# Patient Record
Sex: MALE | Race: BLACK OR AFRICAN AMERICAN | NOT HISPANIC OR LATINO | Employment: UNEMPLOYED | URBAN - METROPOLITAN AREA
[De-identification: names, ages, dates, MRNs, and addresses within clinical notes are randomized per-mention and may not be internally consistent; named-entity substitution may affect disease eponyms.]

---

## 2022-02-14 PROBLEM — U07.1 COVID-19: Status: ACTIVE | Noted: 2022-02-14

## 2022-02-14 PROBLEM — R00.1 SYMPTOMATIC BRADYCARDIA: Status: ACTIVE | Noted: 2022-02-14

## 2022-02-16 PROBLEM — F12.188 CANNABIS HYPEREMESIS SYNDROME CONCURRENT WITH AND DUE TO CANNABIS ABUSE: Status: RESOLVED | Noted: 2022-02-16 | Resolved: 2022-02-16

## 2022-02-16 PROBLEM — R11.0 NAUSEA: Status: ACTIVE | Noted: 2022-02-16

## 2022-02-16 PROBLEM — F12.188 CANNABIS HYPEREMESIS SYNDROME CONCURRENT WITH AND DUE TO CANNABIS ABUSE: Status: ACTIVE | Noted: 2022-02-16

## 2022-02-16 PROBLEM — R11.0 NAUSEA: Status: RESOLVED | Noted: 2022-02-16 | Resolved: 2022-02-16

## 2022-02-16 PROBLEM — R00.1 SYMPTOMATIC BRADYCARDIA: Status: RESOLVED | Noted: 2022-02-14 | Resolved: 2022-02-16

## 2024-03-22 ENCOUNTER — HOSPITAL ENCOUNTER (INPATIENT)
Facility: HOSPITAL | Age: 54
LOS: 1 days | Discharge: LEFT BEFORE DISCHARGE | DRG: 065 | End: 2024-03-22
Attending: STUDENT IN AN ORGANIZED HEALTH CARE EDUCATION/TRAINING PROGRAM | Admitting: INTERNAL MEDICINE

## 2024-03-22 ENCOUNTER — HOSPITAL ENCOUNTER (OUTPATIENT)
Dept: CARDIOLOGY | Facility: HOSPITAL | Age: 54
Discharge: HOME | DRG: 065 | End: 2024-03-22
Attending: STUDENT IN AN ORGANIZED HEALTH CARE EDUCATION/TRAINING PROGRAM

## 2024-03-22 VITALS
BODY MASS INDEX: 27.5 KG/M2 | DIASTOLIC BLOOD PRESSURE: 91 MMHG | SYSTOLIC BLOOD PRESSURE: 175 MMHG | HEART RATE: 65 BPM | OXYGEN SATURATION: 100 % | RESPIRATION RATE: 16 BRPM | WEIGHT: 203.06 LBS | TEMPERATURE: 99 F | HEIGHT: 72 IN

## 2024-03-22 DIAGNOSIS — I63.9 STROKE: ICD-10-CM

## 2024-03-22 DIAGNOSIS — I63.311 THROMBOTIC STROKE INVOLVING RIGHT MIDDLE CEREBRAL ARTERY: ICD-10-CM

## 2024-03-22 PROBLEM — Z72.0 TOBACCO USE: Status: ACTIVE | Noted: 2024-03-22

## 2024-03-22 PROBLEM — F19.90 DRUG USE: Status: ACTIVE | Noted: 2024-03-22

## 2024-03-22 LAB
ABO + RH BLD: NORMAL
ALBUMIN SERPL BCP-MCNC: 3.4 G/DL (ref 3.5–5.2)
ALP SERPL-CCNC: 50 U/L (ref 55–135)
ALT SERPL W/O P-5'-P-CCNC: 14 U/L (ref 10–44)
AMPHET+METHAMPHET UR QL: NEGATIVE
ANION GAP SERPL CALC-SCNC: 5 MMOL/L (ref 8–16)
ASCENDING AORTA: 3.49 CM
AST SERPL-CCNC: 23 U/L (ref 10–40)
AV INDEX (PROSTH): 0.98
AV MEAN GRADIENT: 2 MMHG
AV PEAK GRADIENT: 4 MMHG
AV VALVE AREA BY VELOCITY RATIO: 2.81 CM²
AV VALVE AREA: 3.35 CM²
AV VELOCITY RATIO: 0.82
BARBITURATES UR QL SCN>200 NG/ML: NEGATIVE
BASOPHILS # BLD AUTO: 0.05 K/UL (ref 0–0.2)
BASOPHILS NFR BLD: 0.9 % (ref 0–1.9)
BENZODIAZ UR QL SCN>200 NG/ML: NEGATIVE
BILIRUB SERPL-MCNC: 0.5 MG/DL (ref 0.1–1)
BILIRUB UR QL STRIP: NEGATIVE
BLD GP AB SCN CELLS X3 SERPL QL: NORMAL
BSA FOR ECHO PROCEDURE: 2.16 M2
BUN SERPL-MCNC: 10 MG/DL (ref 6–20)
BZE UR QL SCN: NEGATIVE
CALCIUM SERPL-MCNC: 8.7 MG/DL (ref 8.7–10.5)
CANNABINOIDS UR QL SCN: ABNORMAL
CHLORIDE SERPL-SCNC: 107 MMOL/L (ref 95–110)
CHOLEST SERPL-MCNC: 130 MG/DL (ref 120–199)
CHOLEST/HDLC SERPL: 3.6 {RATIO} (ref 2–5)
CLARITY UR REFRACT.AUTO: CLEAR
CO2 SERPL-SCNC: 24 MMOL/L (ref 23–29)
COLOR UR AUTO: YELLOW
CREAT SERPL-MCNC: 0.8 MG/DL (ref 0.5–1.4)
CREAT UR-MCNC: 86 MG/DL (ref 23–375)
CREAT UR-MCNC: 86 MG/DL (ref 23–375)
CV ECHO LV RWT: 0.66 CM
DIFFERENTIAL METHOD BLD: ABNORMAL
DOP CALC AO PEAK VEL: 1.05 M/S
DOP CALC AO VTI: 22.12 CM
DOP CALC LVOT AREA: 3.4 CM2
DOP CALC LVOT DIAMETER: 2.09 CM
DOP CALC LVOT PEAK VEL: 0.86 M/S
DOP CALC LVOT STROKE VOLUME: 74.17 CM3
DOP CALCLVOT PEAK VEL VTI: 21.63 CM
E WAVE DECELERATION TIME: 330.23 MSEC
E/A RATIO: 1.04
E/E' RATIO: 7.5 M/S
ECHO LV POSTERIOR WALL: 1.38 CM (ref 0.6–1.1)
EOSINOPHIL # BLD AUTO: 0.1 K/UL (ref 0–0.5)
EOSINOPHIL NFR BLD: 1.9 % (ref 0–8)
ERYTHROCYTE [DISTWIDTH] IN BLOOD BY AUTOMATED COUNT: 14.6 % (ref 11.5–14.5)
EST. GFR  (NO RACE VARIABLE): >60 ML/MIN/1.73 M^2
FENTANYL UR QL SCN: ABNORMAL
FRACTIONAL SHORTENING: 40 % (ref 28–44)
GLUCOSE SERPL-MCNC: 93 MG/DL (ref 70–110)
GLUCOSE UR QL STRIP: NEGATIVE
HCT VFR BLD AUTO: 37.7 % (ref 40–54)
HDLC SERPL-MCNC: 36 MG/DL (ref 40–75)
HDLC SERPL: 27.7 % (ref 20–50)
HGB BLD-MCNC: 12.6 G/DL (ref 14–18)
HGB UR QL STRIP: NEGATIVE
IMM GRANULOCYTES # BLD AUTO: 0.01 K/UL (ref 0–0.04)
IMM GRANULOCYTES NFR BLD AUTO: 0.2 % (ref 0–0.5)
INTERVENTRICULAR SEPTUM: 1.2 CM (ref 0.6–1.1)
KETONES UR QL STRIP: ABNORMAL
LA MAJOR: 4.19 CM
LA MINOR: 4.37 CM
LA WIDTH: 3.14 CM
LDLC SERPL CALC-MCNC: 83.8 MG/DL (ref 63–159)
LEFT ATRIUM SIZE: 3.42 CM
LEFT ATRIUM VOLUME INDEX: 18.2 ML/M2
LEFT ATRIUM VOLUME: 39.05 CM3
LEFT INTERNAL DIMENSION IN SYSTOLE: 2.48 CM (ref 2.1–4)
LEFT VENTRICLE DIASTOLIC VOLUME INDEX: 35.86 ML/M2
LEFT VENTRICLE DIASTOLIC VOLUME: 76.73 ML
LEFT VENTRICLE MASS INDEX: 91 G/M2
LEFT VENTRICLE SYSTOLIC VOLUME INDEX: 10.2 ML/M2
LEFT VENTRICLE SYSTOLIC VOLUME: 21.93 ML
LEFT VENTRICULAR INTERNAL DIMENSION IN DIASTOLE: 4.16 CM (ref 3.5–6)
LEFT VENTRICULAR MASS: 195.45 G
LEUKOCYTE ESTERASE UR QL STRIP: NEGATIVE
LV LATERAL E/E' RATIO: 6.82 M/S
LV SEPTAL E/E' RATIO: 8.33 M/S
LYMPHOCYTES # BLD AUTO: 1.7 K/UL (ref 1–4.8)
LYMPHOCYTES NFR BLD: 31.5 % (ref 18–48)
MAGNESIUM SERPL-MCNC: 1.7 MG/DL (ref 1.6–2.6)
MCH RBC QN AUTO: 30.1 PG (ref 27–31)
MCHC RBC AUTO-ENTMCNC: 33.4 G/DL (ref 32–36)
MCV RBC AUTO: 90 FL (ref 82–98)
METHADONE UR QL SCN>300 NG/ML: NEGATIVE
MONOCYTES # BLD AUTO: 0.4 K/UL (ref 0.3–1)
MONOCYTES NFR BLD: 7.5 % (ref 4–15)
MV PEAK A VEL: 0.72 M/S
MV PEAK E VEL: 0.75 M/S
MV STENOSIS PRESSURE HALF TIME: 95.77 MS
MV VALVE AREA P 1/2 METHOD: 2.3 CM2
NEUTROPHILS # BLD AUTO: 3.1 K/UL (ref 1.8–7.7)
NEUTROPHILS NFR BLD: 58 % (ref 38–73)
NITRITE UR QL STRIP: NEGATIVE
NONHDLC SERPL-MCNC: 94 MG/DL
NRBC BLD-RTO: 0 /100 WBC
OPIATES UR QL SCN: ABNORMAL
PCP UR QL SCN>25 NG/ML: NEGATIVE
PH UR STRIP: 7 [PH] (ref 5–8)
PHOSPHATE SERPL-MCNC: 2.6 MG/DL (ref 2.7–4.5)
PLATELET # BLD AUTO: 180 K/UL (ref 150–450)
PMV BLD AUTO: 9.6 FL (ref 9.2–12.9)
POCT GLUCOSE: 98 MG/DL (ref 70–110)
POCT GLUCOSE: 98 MG/DL (ref 70–110)
POTASSIUM SERPL-SCNC: 3.9 MMOL/L (ref 3.5–5.1)
PROT SERPL-MCNC: 6.5 G/DL (ref 6–8.4)
PROT UR QL STRIP: NEGATIVE
RA MAJOR: 5.11 CM
RA PRESSURE ESTIMATED: 3 MMHG
RA WIDTH: 3.14 CM
RBC # BLD AUTO: 4.18 M/UL (ref 4.6–6.2)
RIGHT VENTRICULAR END-DIASTOLIC DIMENSION: 2.66 CM
SINUS: 3 CM
SODIUM SERPL-SCNC: 136 MMOL/L (ref 136–145)
SP GR UR STRIP: >1.03 (ref 1–1.03)
SPECIMEN OUTDATE: NORMAL
STJ: 2.93 CM
TDI LATERAL: 0.11 M/S
TDI SEPTAL: 0.09 M/S
TDI: 0.1 M/S
TOXICOLOGY INFORMATION: ABNORMAL
TRICUSPID ANNULAR PLANE SYSTOLIC EXCURSION: 2.19 CM
TRIGL SERPL-MCNC: 51 MG/DL (ref 30–150)
TSH SERPL DL<=0.005 MIU/L-ACNC: 0.48 UIU/ML (ref 0.4–4)
URN SPEC COLLECT METH UR: ABNORMAL
WBC # BLD AUTO: 5.3 K/UL (ref 3.9–12.7)
Z-SCORE OF LEFT VENTRICULAR DIMENSION IN END DIASTOLE: -5.03
Z-SCORE OF LEFT VENTRICULAR DIMENSION IN END SYSTOLE: -4.16

## 2024-03-22 PROCEDURE — 84443 ASSAY THYROID STIM HORMONE: CPT | Mod: 91 | Performed by: STUDENT IN AN ORGANIZED HEALTH CARE EDUCATION/TRAINING PROGRAM

## 2024-03-22 PROCEDURE — 81003 URINALYSIS AUTO W/O SCOPE: CPT | Mod: 91 | Performed by: STUDENT IN AN ORGANIZED HEALTH CARE EDUCATION/TRAINING PROGRAM

## 2024-03-22 PROCEDURE — 99223 1ST HOSP IP/OBS HIGH 75: CPT | Mod: FS,,, | Performed by: PSYCHIATRY & NEUROLOGY

## 2024-03-22 PROCEDURE — 82962 GLUCOSE BLOOD TEST: CPT

## 2024-03-22 PROCEDURE — 99284 EMERGENCY DEPT VISIT MOD MDM: CPT | Mod: 25

## 2024-03-22 PROCEDURE — 25000003 PHARM REV CODE 250: Performed by: STUDENT IN AN ORGANIZED HEALTH CARE EDUCATION/TRAINING PROGRAM

## 2024-03-22 PROCEDURE — 80053 COMPREHEN METABOLIC PANEL: CPT | Mod: 91 | Performed by: STUDENT IN AN ORGANIZED HEALTH CARE EDUCATION/TRAINING PROGRAM

## 2024-03-22 PROCEDURE — 83735 ASSAY OF MAGNESIUM: CPT | Mod: 91 | Performed by: STUDENT IN AN ORGANIZED HEALTH CARE EDUCATION/TRAINING PROGRAM

## 2024-03-22 PROCEDURE — 80354 DRUG SCREENING FENTANYL: CPT | Performed by: STUDENT IN AN ORGANIZED HEALTH CARE EDUCATION/TRAINING PROGRAM

## 2024-03-22 PROCEDURE — 12000002 HC ACUTE/MED SURGE SEMI-PRIVATE ROOM

## 2024-03-22 PROCEDURE — 84100 ASSAY OF PHOSPHORUS: CPT | Performed by: STUDENT IN AN ORGANIZED HEALTH CARE EDUCATION/TRAINING PROGRAM

## 2024-03-22 PROCEDURE — 97162 PT EVAL MOD COMPLEX 30 MIN: CPT

## 2024-03-22 PROCEDURE — 85025 COMPLETE CBC W/AUTO DIFF WBC: CPT | Mod: 91 | Performed by: STUDENT IN AN ORGANIZED HEALTH CARE EDUCATION/TRAINING PROGRAM

## 2024-03-22 PROCEDURE — 80061 LIPID PANEL: CPT | Mod: 91 | Performed by: STUDENT IN AN ORGANIZED HEALTH CARE EDUCATION/TRAINING PROGRAM

## 2024-03-22 PROCEDURE — 97116 GAIT TRAINING THERAPY: CPT

## 2024-03-22 PROCEDURE — 93306 TTE W/DOPPLER COMPLETE: CPT

## 2024-03-22 PROCEDURE — 80307 DRUG TEST PRSMV CHEM ANLYZR: CPT | Performed by: STUDENT IN AN ORGANIZED HEALTH CARE EDUCATION/TRAINING PROGRAM

## 2024-03-22 PROCEDURE — 93306 TTE W/DOPPLER COMPLETE: CPT | Mod: 26,,, | Performed by: INTERNAL MEDICINE

## 2024-03-22 PROCEDURE — 86901 BLOOD TYPING SEROLOGIC RH(D): CPT | Performed by: STUDENT IN AN ORGANIZED HEALTH CARE EDUCATION/TRAINING PROGRAM

## 2024-03-22 RX ORDER — ATORVASTATIN CALCIUM 40 MG/1
40 TABLET, FILM COATED ORAL DAILY
Status: DISCONTINUED | OUTPATIENT
Start: 2024-03-22 | End: 2024-03-22 | Stop reason: HOSPADM

## 2024-03-22 RX ORDER — SODIUM,POTASSIUM PHOSPHATES 280-250MG
2 POWDER IN PACKET (EA) ORAL
Status: DISCONTINUED | OUTPATIENT
Start: 2024-03-22 | End: 2024-03-22 | Stop reason: HOSPADM

## 2024-03-22 RX ORDER — BISACODYL 10 MG/1
10 SUPPOSITORY RECTAL DAILY PRN
Status: DISCONTINUED | OUTPATIENT
Start: 2024-03-22 | End: 2024-03-22 | Stop reason: HOSPADM

## 2024-03-22 RX ORDER — LANOLIN ALCOHOL/MO/W.PET/CERES
800 CREAM (GRAM) TOPICAL
Status: DISCONTINUED | OUTPATIENT
Start: 2024-03-22 | End: 2024-03-22 | Stop reason: HOSPADM

## 2024-03-22 RX ORDER — SODIUM CHLORIDE 0.9 % (FLUSH) 0.9 %
10 SYRINGE (ML) INJECTION
Status: DISCONTINUED | OUTPATIENT
Start: 2024-03-22 | End: 2024-03-22 | Stop reason: HOSPADM

## 2024-03-22 RX ADMIN — ATORVASTATIN CALCIUM 40 MG: 40 TABLET, FILM COATED ORAL at 11:03

## 2024-03-22 NOTE — ED PROVIDER NOTES
"Encounter Date: 3/22/2024       History     Chief Complaint   Patient presents with    Cerebrovascular Accident     Transfer from [place] following onset of weakness to [facility] last night. Presents for "stroke" and NSGY consult. Received TNK at outside facility.     HPI    53-year-old male past medical history hypertension, substance use disorder who presents to the ER for evaluation of a possible stroke.  He awoke at 4:00 a.m. this morning with left lower leg weakness/numbness.  Some trouble speaking and dizziness.  Presented to an outside facility where he was seen by tele neurology and given TNK.  He was transferred here continued evaluation.  Patient is a heavy fentanyl user, and snorts this multiple times daily.  He adamantly denies any injection.  He denies any fevers, chills, back pain, neck pain, syncope, nausea vomiting, headache, neck pain, or any trauma.  Currently he feels as though his symptoms have improved.        Review of patient's allergies indicates:  No Known Allergies  History reviewed. No pertinent past medical history.  History reviewed. No pertinent surgical history.  History reviewed. No pertinent family history.  Social History     Tobacco Use    Smoking status: Some Days     Types: Cigars   Substance Use Topics    Alcohol use: Yes     Comment: OCCASIONAL    Drug use: Yes     Types: Marijuana, Heroin     Comment: heroin     Review of Systems   Constitutional:  Negative for fever.   HENT:  Negative for sore throat.    Respiratory:  Negative for shortness of breath.    Cardiovascular:  Negative for chest pain.   Genitourinary:  Negative for dysuria.   Musculoskeletal:  Negative for back pain.   Skin:  Negative for rash.   Neurological:  Positive for weakness.   Hematological:  Does not bruise/bleed easily.       Physical Exam     Initial Vitals [03/22/24 0805]   BP Pulse Resp Temp SpO2   137/78 70 (!) 21 98.2 °F (36.8 °C) 100 %      MAP       --         Physical Exam    Nursing note and " vitals reviewed.  Constitutional: He appears well-nourished.   HENT:   Head: Atraumatic.   Eyes: EOM are normal.   Neck: Neck supple.   Cardiovascular:  Normal rate and regular rhythm.           Pulmonary/Chest: Breath sounds normal. No respiratory distress.   Musculoskeletal:      Cervical back: Neck supple.     Neurological: He is alert and oriented to person, place, and time.   Subjective decreased sensation of the left lower extremity with 4/5 strengt, all others 5/5; mild left facial droop, no dysarthria, no aphasia, gait deferred   Skin: Skin is warm and dry.   Psychiatric: He has a normal mood and affect. Thought content normal.         ED Course   Procedures  Labs Reviewed   DRUG SCREEN PANEL, URINE EMERGENCY   FENTANYL, URINE   URINALYSIS, REFLEX TO URINE CULTURE   COMPREHENSIVE METABOLIC PANEL   LIPID PANEL   TSH   MAGNESIUM   PHOSPHORUS   CBC W/ AUTO DIFFERENTIAL   HEMOGLOBIN A1C   TYPE & SCREEN   POCT GLUCOSE MONITORING CONTINUOUS   POCT GLUCOSE MONITORING CONTINUOUS          Imaging Results              MRI Brain Without Contrast (In process)  Result time 03/22/24 09:24:40                     Medications   sodium chloride 0.9% flush 10 mL (has no administration in time range)   bisacodyL suppository 10 mg (has no administration in time range)   potassium bicarbonate disintegrating tablet 50 mEq (has no administration in time range)   potassium bicarbonate disintegrating tablet 35 mEq (has no administration in time range)   potassium bicarbonate disintegrating tablet 60 mEq (has no administration in time range)   magnesium oxide tablet 800 mg (has no administration in time range)   magnesium oxide tablet 800 mg (has no administration in time range)   potassium, sodium phosphates 280-160-250 mg packet 2 packet (has no administration in time range)   potassium, sodium phosphates 280-160-250 mg packet 2 packet (has no administration in time range)   potassium, sodium phosphates 280-160-250 mg packet 2  packet (has no administration in time range)   atorvastatin tablet 40 mg (has no administration in time range)     Medical Decision Making  Amount and/or Complexity of Data Reviewed  Labs: ordered.  Radiology: ordered.    Risk  Decision regarding hospitalization.               ED Course as of 03/22/24 0943   Fri Mar 22, 2024   0847 [facility] and [facility] already aware of patient's arrival. [DR]      ED Course User Index  [DR] Kvng Rondon MD                         53-year-old male presenting as a transfer from outside facility after TNK for suspected stroke.  Significant substance use disorder history, snorts fentanyl.  Vital signs stable in emergency room, physical exam as above, he reports improvement.  He was stable without any worsening changes on his exam.  Neuro critical Care and vascular neurology were made aware of the patient's arrival, MRI and urine drug screen were ordered.  He was admitted to neuro critical care.        Clinical Impression:  Final diagnoses:  [I63.311] Thrombotic stroke involving right middle cerebral artery          ED Disposition Condition    Admit                 Kvng Rondon MD  03/22/24 0936

## 2024-03-22 NOTE — CONSULTS
Inpatient consult to Physical Medicine Rehab  Consult performed by: Gali Gautam NP  Consult ordered by: Vincent Mathews MD  Reason for consult: rehab      Consult received.     SHERON Trujillo, FNP-C  Physical Medicine & Rehabilitation   03/22/2024

## 2024-03-22 NOTE — PLAN OF CARE
Pt w/ resolution of LLE symptoms, able to ambulate w/ SBA for safety. Pt does not require skilled PT intervention at this time, will d/c orders, please reconsult if there is a change in pt status requiring reassessment.   Problem: Physical Therapy  Goal: Physical Therapy Goal  Description: Goals to be completed by: 4/22/24    Pt will perform sup<>sit transfers w/ independence  Pt will have sufficient dynamic balance to sit EOB while performing ADLs/therex w/ independence  Pt will be able to stand up from EOB w/ supervision using no AD  Pt will ambulate 350 feet w/ stand by assistance using no AD  Outcome: Met

## 2024-03-22 NOTE — ASSESSMENT & PLAN NOTE
Tigre Galindo is a 53 y.o. male with PMHx of tobacco abuse and drug use (marijuana and heroine) who presented to [place] ED with L facial droop, LLE weakness, LLE numbness, and dysarthria. He was LKN at 0300. Patient was seen via telemedicine, [facility] 5, treated with TNK. CTA at OSH reviewed, no LVO or significant stenosis. Patient was transferred to [facility] for post TNK care.    Patient reports improvement in LLE weakness post TNK. MRI, TTE, and A1C pending to complete stroke workup    Antithrombotics for secondary stroke prevention: Antiplatelets: None: Hold all Antithrombotics x 24 hours after IV Thrombolytic therapy administration    Statins for secondary stroke prevention and hyperlipidemia, if present:   Statins: Atorvastatin- 40 mg daily    Aggressive risk factor modification: Smoking, drug use     Rehab efforts: The patient has been evaluated by a stroke team provider and the therapy needs have been fully considered based off the presenting complaints and exam findings. The following therapy evaluations are needed: PT evaluate and treat, OT evaluate and treat, SLP evaluate and treat    Diagnostics ordered/pending: HgbA1C to assess blood glucose levels, MRI head without contrast to assess brain parenchyma, Other: TTE with bubble    VTE prophylaxis: Mechanical prophylaxis: Place SCDs  None: Reason for No Pharmacological VTE Prophylaxis: Holding x 24 hours s/p treatment with Thrombolytic therapy    BP parameters: Infarct: Post Thrombolytic therapy, SBP <180

## 2024-03-22 NOTE — ED NOTES
Pt identifiers Tigre Galindo were checked and are correct  LOC: The patient is awake, alert, aware of environment with an appropriate affect. Oriented x4, speaking appropriately  APPEARANCE: Pt resting comfortably, in no acute distress, pt is clean and well groomed, clothing properly fastened  SKIN: Skin warm, dry and intact, normal skin turgor, moist mucus membranes  RESPIRATORY: Airway is open and patent, respirations are spontaneous, even and unlabored, normal effort and rate  CARDIAC: Normal rate and rhythm, no peripheral edema noted, capillary refill < 3 seconds, bilateral radial pulses 2+  ABDOMEN: Soft, nontender, nondistended. Bowel sounds present to all four quad of abd on auscultation  NEUROLOGIC: PERRL, facial expression is symmetrical, patient moving all extremities spontaneously, normal sensation in all extremities when touched with a finger except states has numbness in left third toe .  Follows all commands appropriately  MUSCULOSKELETAL: No obvious deformities.

## 2024-03-22 NOTE — ASSESSMENT & PLAN NOTE
Stroke risk factor  Patient reports marijuana and heroine use  Recommend UDS   Monitor for signs/symptoms of withdrawal

## 2024-03-22 NOTE — ED NOTES
This RN found pt ambulating in hallway attempting to leave/elope. Pt states does not want to stay and wait for provider for AMA paperwork. RN removed bilateral PIVs. Pt educated on importance on staying and that he is admitted to [facility] for receiving TNK after stroke. Pt expressed understanding and states he is going into withdrawals from heroin and can not wait anymore. Primary RN Fanny notified.

## 2024-03-22 NOTE — ED NOTES
Dr Mathews neuro critical notified pt was requesting medication for Heroin with drawal, wanted to eat a meal and then  eloped  Dr Mathews notified A1C needed to be collected for lab to run the specimen but the patient left the Ed

## 2024-03-22 NOTE — ED NOTES
Pt states he last used Heroin was at midnight and around 04:00 am he got up to go to  the Bathroom    He then felt  numbness in his left leg

## 2024-03-22 NOTE — SIGNIFICANT EVENT
Neurocritical Care  Significant Event    Call received from ED RN stating that patient had eloped. Per nursing, patient had requested medication for heroin withdrawal and then immediately requested to leave. Notified that patient did not want to wait for a provider and that he had already left [facility] property. No AMA form was signed and patient had already left by the time provider was notified, unable to advise on risks associated with the refusal of medical care. I advised ED staff to document the conversation, the refusal to wait for a provider and to follow normal patient elopement/AMA protocols.       Vincent Mathews MD, MPH  Neurocritical Care Fellow  [facility]  [address] [place] [address]

## 2024-03-22 NOTE — PT/OT/SLP EVAL
"Physical Therapy Evaluation and Discharge Note    Patient Name:  Tigre Galindo   MRN:  [id number]    Recommendations:     Discharge Recommendations: No Therapy Indicated  Discharge Equipment Recommendations: none   Barriers to discharge: None    Assessment:     Tigre Galindo is a 53 y.o. male admitted with a medical diagnosis of Thrombotic stroke involving right middle cerebral artery. Pt's LLE weakness and numbness have resolved and he was able to ambulate around the emergency room w/ SBA for safety. He does have a mild limp when ambulating but reports this is his baseline. At this time, patient is functioning at their prior level of function and does not require further acute PT services.     Recent Surgery: * No surgery found *      Plan:     During this hospitalization, patient does not require further acute PT services.  Please re-consult if situation changes.      Subjective     Chief Complaint: came in because he felt he was going to pass out  Patient/Family Comments/goals: pt reports feeling much better once up and moving and that his ambulation has returned to baseline  Pain/Comfort:  Pain Rating 1: 0/10  Pain Rating Post-Intervention 1: 0/10    Patients cultural, spiritual, [employer] conflicts given the current situation: no    Living Environment:  Pt lives alone in a [facility] w/ [facility].  Prior to admission, patients level of function was independent; enjoys riding his bike, doesn't work or drive.  Equipment used at home: none.  DME owned (not currently used): none.  Upon discharge, patient will have assistance from his "ex girl" who he states works but could come help him if needed.    Objective:     Communicated with RN prior to session.  Patient found HOB elevated with telemetry, blood pressure cuff, pulse ox (continuous) upon PT entry to room.    General Precautions: Standard, fall    Orthopedic Precautions:N/A   Braces: N/A  Respiratory Status: Room air    Exams:  Cognitive Exam:  Patient is oriented to " Person, Place, Time, and Situation  Fine Motor Coordination:    -       Intact  Left hand, finger to nose, Right hand, finger to nose, Left hand thumb/finger opposition skills, and Right hand thumb/finger opposition skills  Sensation:    -       Intact  light/touch BLE  RLE ROM: WFL  RLE Strength: WFL  LLE ROM: WFL  LLE Strength: WFL    Functional Mobility:  Bed Mobility:     Supine to Sit: independence  Sit to Supine: independence  Transfers:     Sit to Stand:  supervision with no AD  Gait: 350' w/ SBA for safety 2/2 pt still in 24hr TNK window; pt demonstrates mild limp and dec LUE swing  Tinetti Total Score: 26  < 18 = High Risk of Falls, 19-23 = Moderate Risk of Falls, > 24 = Low Risk of Falls    AM-PAC 6 CLICK MOBILITY  Total Score:22       Treatment and Education:  Gait training w/ verbal and visual cueing for L arm swing, step length, safety awareness, and obstacle avoidance   Pt educated on PT assessment and plan to d/c orders 2/2 no current needs for skilled PT. Pt instructed to slowly progress mobility each day towards PLOF without attempting to jump right into previous daily routine immediately upon d/c 2/2 general deconditioning from being in hospital. Pt in agreement w/ POC and verbalized understanding w/ plan to slowly progress to normal daily activities at home and inform MD team if pt experiences any difficulty progressing back to PLOF while here or upon d/c in order for PT to be re-consulted/orders placed for OP PT as needed.     AM-PAC 6 CLICK MOBILITY  Total Score:22     Patient left HOB elevated with all lines intact, call button in reach, and RN and echo tech present.    GOALS:   Multidisciplinary Problems       Physical Therapy Goals       Not on file              Multidisciplinary Problems (Resolved)          Problem: Physical Therapy    Goal Priority Disciplines Outcome Goal Variances Interventions   Physical Therapy Goal   (Resolved)     PT, PT/OT Met     Description: Goals to be completed  by: 4/22/24    Pt will perform sup<>sit transfers w/ independence  Pt will have sufficient dynamic balance to sit EOB while performing ADLs/therex w/ independence  Pt will be able to stand up from EOB w/ supervision using no AD  Pt will ambulate 350 feet w/ stand by assistance using no AD                       History:     History reviewed. No pertinent past medical history.    History reviewed. No pertinent surgical history.    Time Tracking:     PT Received On: 03/22/24  PT Start Time: 1054     PT Stop Time: 1114  PT Total Time (min): 20 min     Billable Minutes: Evaluation 10 and Gait Training 10      03/22/2024

## 2024-03-22 NOTE — HPI
Tigre Galindo is a 53 y.o. male with PMHx of tobacco abuse and drug use who presented to [place] ED with L facial droop, LLE weakness, and dysarthria. He was LKN at 0300. Patient was seen via telemedicine, [facility] 5, treated with TNK. CTA at OSH reviewed, no LVO or significant stenosis. Patient was transferred to [facility] for post TNK care.    Patient states he woke up at 0300 and went to the restroom. When walking from the restroom he felt lightheaded and unwell. He had loss of sensation in his LLE with LLE weakness. Patient called EMS.

## 2024-03-22 NOTE — H&P
[facility] - Emergency Dept  Vascular Neurology  Comprehensive Stroke Center  History & Physical    Consults  Assessment/Plan:     Patient is a 53 y.o. year old male with:    * Thrombotic stroke involving right middle cerebral artery  Tigre Galindo is a 53 y.o. male with PMHx of tobacco abuse and drug use (marijuana and heroine) who presented to [place] ED with L facial droop, LLE weakness, LLE numbness, and dysarthria. He was LKN at 0300. Patient was seen via telemedicine, [facility] 5, treated with TNK. CTA at OSH reviewed, no LVO or significant stenosis. Patient was transferred to [facility] for post TNK care.    Patient reports improvement in LLE weakness post TNK. MRI, TTE, and A1C pending to complete stroke workup    Antithrombotics for secondary stroke prevention: Antiplatelets: None: Hold all Antithrombotics x 24 hours after IV Thrombolytic therapy administration    Statins for secondary stroke prevention and hyperlipidemia, if present:   Statins: Atorvastatin- 40 mg daily    Aggressive risk factor modification: Smoking, drug use     Rehab efforts: The patient has been evaluated by a stroke team provider and the therapy needs have been fully considered based off the presenting complaints and exam findings. The following therapy evaluations are needed: PT evaluate and treat, OT evaluate and treat, SLP evaluate and treat    Diagnostics ordered/pending: HgbA1C to assess blood glucose levels, MRI head without contrast to assess brain parenchyma, Other: TTE with bubble    VTE prophylaxis: Mechanical prophylaxis: Place SCDs  None: Reason for No Pharmacological VTE Prophylaxis: Holding x 24 hours s/p treatment with Thrombolytic therapy    BP parameters: Infarct: Post Thrombolytic therapy, SBP <180        Drug use  Stroke risk factor  Patient reports marijuana and heroine use  Recommend UDS   Monitor for signs/symptoms of withdrawal    Tobacco use  Stroke risk factor  Smokes 3 black and mild cigars daily   on  cessation        STROKE DOCUMENTATION     Acute Stroke Times   Last Known Normal Date: 03/22/24  Last Known Normal Time: 0300    NIH Scale:  1a. Level of Consciousness: 0-->Alert, keenly responsive  1b. LOC Questions: 0-->Answers both questions correctly  1c. LOC Commands: 0-->Performs both tasks correctly  2. Best Gaze: 0-->Normal  3. Visual: 0-->No visual loss  4. Facial Palsy: 2-->Partial paralysis (total or near-total paralysis of lower face)  5a. Motor Arm, Left: 0-->No drift, limb holds 90 (or 45) degrees for full 10 secs  5b. Motor Arm, Right: 0-->No drift, limb holds 90 (or 45) degrees for full 10 secs  6a. Motor Leg, Left: 0-->No drift, leg holds 30 degree position for full 5 secs  6b. Motor Leg, Right: 0-->No drift, leg holds 30 degree position for full 5 secs  7. Limb Ataxia: 0-->Absent  8. Sensory: 1-->Mild-to-moderate sensory loss, patient feels pinprick is less sharp or is dull on the affected side, or there is a loss of superficial pain with pinprick, but patient is aware of being touched  9. Best Language: 0-->No aphasia, normal  10. Dysarthria: 1-->Mild-to-moderate dysarthria, patient slurs at least some words and, at worst, can be understood with some difficulty  11. Extinction and Inattention (formerly Neglect): 0-->No abnormality  Total (NIH Stroke Scale): 4     Modified Barton Score: 0  [place] Coma Scale:    ABCD2 Score:    [id number] Score:   HAS -BLED Score:   ICH Score:   Hunt & Evans Classification:      Thrombolysis Candidate? Yes, given prior to arrival at outside hospital    Delays to Thrombolysis?  Not Applicable    Interventional Revascularization Candidate?   Is the patient eligible for mechanical endovascular reperfusion (DIAZ)?  No; No large vessel occlusion identified on imaging , No; no significant neurologic deficit (NIHSS <6) , and No; at this time symptoms not suggestive of large vessel occlusion    Delays to Thrombectomy? Not Applicable    Hemorrhagic change of an Ischemic  Stroke: Does this patient have an ischemic stroke with hemorrhagic changes? No         Subjective:     History of Present Illness:  Tigre Galindo is a 53 y.o. male with PMHx of tobacco abuse and drug use who presented to [place] ED with L facial droop, LLE weakness, and dysarthria. He was LKN at 0300. Patient was seen via telemedicine, [facility] 5, treated with TNK. CTA at OSH reviewed, no LVO or significant stenosis. Patient [place] [facility] [place].    Patient states he woke up at 0300 and went to the restroom. When walking from the restroom he felt lightheaded and unwell. He had loss of sensation in his LLE with LLE weakness. Patient called EMS.                           No past medical history on file.  No past surgical history on file.  Social History     Tobacco Use    Smoking status: Some Days     Types: Cigars   Substance Use Topics    Alcohol use: Yes     Comment: OCCASIONAL    Drug use: Yes     Types: Marijuana     Review of patient's allergies indicates:  No Known Allergies    Medications: I have reviewed the current medication administration record.    (Not in a hospital admission)      Review of Systems   Neurological:  Positive for facial asymmetry, speech difficulty, weakness, light-headedness and numbness.     Objective:     Vital Signs (Most Recent):  Temp: 98.5 °F (36.9 °C) (03/22/24 0836)  Pulse: (!) 59 (03/22/24 0836)  Resp: 16 (03/22/24 0836)  BP: (!) 141/83 (03/22/24 0836)  SpO2: 99 % (03/22/24 0836)    Vital Signs Range (Last 24H):  Temp:  [98.2 °F (36.8 °C)-98.5 °F (36.9 °C)]   Pulse:  [54-93]   Resp:  [16-21]   BP: (129-162)/(73-87)   SpO2:  [99 %-100 %]        Physical Exam  Vitals reviewed.   Constitutional:       General: He is not in acute distress.  HENT:      Head: Normocephalic and atraumatic.   Cardiovascular:      Rate and Rhythm: Bradycardia present.   Pulmonary:      Effort: Pulmonary effort is normal. No respiratory distress.   Skin:     General: Skin is warm and  "dry.   Neurological:      Mental Status: He is alert.              Neurological Exam:   LOC: alert  Attention Span: Good   Language: No aphasia  Articulation: Dysarthria  Orientation: Person, Place, Time   Visual Fields: Full  EOM (CN III, IV, VI): Full/intact  Facial Sensation (CN V): Normal  Facial Movement (CN VII): Lower facial weakness on the Left  Motor: Arm left  Normal 5/5  Leg left  Normal 5/5  Arm right  Normal 5/5  Leg right Normal 5/5  Cerebellum: No evidence of appendicular or axial ataxia  Sensation: decreased sensation in LLE to light touch  Tone: Normal tone throughout      Laboratory:  CMP:   Recent Labs   Lab 03/22/24  0503   CALCIUM 8.4*   ALBUMIN 3.5   PROT 6.3      K 4.0   CO2 22*      BUN 12   CREATININE 0.8   ALKPHOS 49*   ALT 14   AST 23   BILITOT 0.4     CBC:   Recent Labs   Lab 03/22/24  0503   WBC 5.20   RBC 4.06*   HGB 12.5*   HCT 37.0*      MCV 91   MCH 30.8   MCHC 33.8     Lipid Panel:   Recent Labs   Lab 03/22/24  0503   CHOL 134   LDLCALC 83.2   HDL 37*   TRIG 69     Coagulation:   Recent Labs   Lab 03/22/24  0503   INR 1.1     Hgb A1C: No results for input(s): "HGBA1C" in the last 168 hours.  TSH:   Recent Labs   Lab 03/22/24  0503   TSH 1.060       Diagnostic Results:      Brain imaging:  MRI Pending    Vessel Imaging:  CT Head 3/22/24  Impression:     There is no evidence for acute intracranial process.     Paranasal sinus disease is noted, as above.       CTA Head and Neck OSH 3/22/24  Impression:     The examination is limited, diminished sensitivity as discussed above, when accounting for limitations of the examination the major arterial vascular structures of the head and neck demonstrate no evidence for high-grade stenosis or occlusion.     There is no evidence for acute intracranial process.     Paranasal sinus findings as above.     Small hypodense finding of the supraglottic airway on the right as discussed above may relate to a small cystic structure of " uncertain etiology, clinical and historical correlation is needed, if indicated direct visualization.    Cardiac Evaluation:   TTE pending        Gali Russell PA-C  Com[facility] St[facility]  Department of Vascular Neurology   [facility] Dept

## 2024-03-22 NOTE — Clinical Note
Diagnosis: Thrombotic stroke involving right middle cerebral artery [116984]   Future Attending Provider: LUCY POLANCO [[id number]]   Reason for IP Medical Treatment  (Clinical interventions that can only be accomplished in the IP setting? ) :: Stroke   I certify that Inpatient services for greater than or equal to 2 midnights are medically necessary:: Yes   Plans for Post-Acute care--if anticipated (pick the single best option):: A. No post acute care anticipated at this time

## 2024-03-22 NOTE — SUBJECTIVE & OBJECTIVE
No past medical history on file.  No past surgical history on file.  Social History     Tobacco Use    Smoking status: Some Days     Types: Cigars   Substance Use Topics    Alcohol use: Yes     Comment: OCCASIONAL    Drug use: Yes     Types: Marijuana     Review of patient's allergies indicates:  No Known Allergies    Medications: I have reviewed the current medication administration record.    (Not in a hospital admission)      Review of Systems   Neurological:  Positive for facial asymmetry, speech difficulty, weakness, light-headedness and numbness.     Objective:     Vital Signs (Most Recent):  Temp: 98.5 °F (36.9 °C) (03/22/24 0836)  Pulse: (!) 59 (03/22/24 0836)  Resp: 16 (03/22/24 0836)  BP: (!) 141/83 (03/22/24 0836)  SpO2: 99 % (03/22/24 0836)    Vital Signs Range (Last 24H):  Temp:  [98.2 °F (36.8 °C)-98.5 °F (36.9 °C)]   Pulse:  [54-93]   Resp:  [16-21]   BP: (129-162)/(73-87)   SpO2:  [99 %-100 %]        Physical Exam  Vitals reviewed.   Constitutional:       General: He is not in acute distress.  HENT:      Head: Normocephalic and atraumatic.   Cardiovascular:      Rate and Rhythm: Bradycardia present.   Pulmonary:      Effort: Pulmonary effort is normal. No respiratory distress.   Skin:     General: Skin is warm and dry.   Neurological:      Mental Status: He is alert.              Neurological Exam:   LOC: alert  Attention Span: Good   Language: No aphasia  Articulation: Dysarthria  Orientation: Person, Place, Time   Visual Fields: Full  EOM (CN III, IV, VI): Full/intact  Facial Sensation (CN V): Normal  Facial Movement (CN VII): Lower facial weakness on the Left  Motor: Arm left  Normal 5/5  Leg left  Normal 5/5  Arm right  Normal 5/5  Leg right Normal 5/5  Cerebellum: No evidence of appendicular or axial ataxia  Sensation: decreased sensation in LLE to light touch  Tone: Normal tone throughout      Laboratory:  CMP:   Recent Labs   Lab 03/22/24  0503   CALCIUM 8.4*   ALBUMIN 3.5   PROT 6.3  "     K 4.0   CO2 22*      BUN 12   CREATININE 0.8   ALKPHOS 49*   ALT 14   AST 23   BILITOT 0.4     CBC:   Recent Labs   Lab 03/22/24  0503   WBC 5.20   RBC 4.06*   HGB 12.5*   HCT 37.0*      MCV 91   MCH 30.8   MCHC 33.8     Lipid Panel:   Recent Labs   Lab 03/22/24  0503   CHOL 134   LDLCALC 83.2   HDL 37*   TRIG 69     Coagulation:   Recent Labs   Lab 03/22/24  0503   INR 1.1     Hgb A1C: No results for input(s): "HGBA1C" in the last 168 hours.  TSH:   Recent Labs   Lab 03/22/24  0503   TSH 1.060       Diagnostic Results:      Brain imaging:  MRI Pending    Vessel Imaging:  CT Head 3/22/24  Impression:     There is no evidence for acute intracranial process.     Paranasal sinus disease is noted, as above.       CTA Head and Neck OSH 3/22/24  Impression:     The examination is limited, diminished sensitivity as discussed above, when accounting for limitations of the examination the major arterial vascular structures of the head and neck demonstrate no evidence for high-grade stenosis or occlusion.     There is no evidence for acute intracranial process.     Paranasal sinus findings as above.     Small hypodense finding of the supraglottic airway on the right as discussed above may relate to a small cystic structure of uncertain etiology, clinical and historical correlation is needed, if indicated direct visualization.    Cardiac Evaluation:   TTE pending    "

## 2024-03-22 NOTE — ED NOTES
Pt ambulated in room with physical therapy  Pt states he  does not have numbness to left third  toe and no longer has left  side facial droop

## 2024-03-28 ENCOUNTER — HOSPITAL ENCOUNTER (EMERGENCY)
Facility: HOSPITAL | Age: 54
Discharge: HOME | End: 2024-03-28
Attending: EMERGENCY MEDICINE

## 2024-03-28 VITALS
DIASTOLIC BLOOD PRESSURE: 83 MMHG | TEMPERATURE: 99 F | HEART RATE: 77 BPM | OXYGEN SATURATION: 100 % | SYSTOLIC BLOOD PRESSURE: 160 MMHG | WEIGHT: 260 LBS | RESPIRATION RATE: 16 BRPM | BODY MASS INDEX: 36.26 KG/M2

## 2024-03-28 DIAGNOSIS — R42 DIZZINESS: ICD-10-CM

## 2024-03-28 DIAGNOSIS — R00.0 TACHYCARDIA: ICD-10-CM

## 2024-03-28 LAB
ALBUMIN SERPL BCP-MCNC: 3.8 G/DL (ref 3.5–5.2)
ALP SERPL-CCNC: 52 U/L (ref 55–135)
ALT SERPL W/O P-5'-P-CCNC: 17 U/L (ref 10–44)
ANION GAP SERPL CALC-SCNC: 6 MMOL/L (ref 8–16)
AST SERPL-CCNC: 26 U/L (ref 10–40)
BASOPHILS # BLD AUTO: 0.04 K/UL (ref 0–0.2)
BASOPHILS NFR BLD: 0.7 % (ref 0–1.9)
BILIRUB SERPL-MCNC: 0.6 MG/DL (ref 0.1–1)
BUN SERPL-MCNC: 7 MG/DL (ref 6–20)
CALCIUM SERPL-MCNC: 9.6 MG/DL (ref 8.7–10.5)
CHLORIDE SERPL-SCNC: 106 MMOL/L (ref 95–110)
CO2 SERPL-SCNC: 26 MMOL/L (ref 23–29)
CREAT SERPL-MCNC: 0.9 MG/DL (ref 0.5–1.4)
DIFFERENTIAL METHOD BLD: ABNORMAL
EOSINOPHIL # BLD AUTO: 0.1 K/UL (ref 0–0.5)
EOSINOPHIL NFR BLD: 2.1 % (ref 0–8)
ERYTHROCYTE [DISTWIDTH] IN BLOOD BY AUTOMATED COUNT: 14.6 % (ref 11.5–14.5)
EST. GFR  (NO RACE VARIABLE): >60 ML/MIN/1.73 M^2
GLUCOSE SERPL-MCNC: 92 MG/DL (ref 70–110)
HCT VFR BLD AUTO: 39 % (ref 40–54)
HGB BLD-MCNC: 13.1 G/DL (ref 14–18)
IMM GRANULOCYTES # BLD AUTO: 0.01 K/UL (ref 0–0.04)
IMM GRANULOCYTES NFR BLD AUTO: 0.2 % (ref 0–0.5)
INR PPP: 1.1 (ref 0.8–1.2)
LYMPHOCYTES # BLD AUTO: 2 K/UL (ref 1–4.8)
LYMPHOCYTES NFR BLD: 35 % (ref 18–48)
MAGNESIUM SERPL-MCNC: 1.9 MG/DL (ref 1.6–2.6)
MCH RBC QN AUTO: 29.8 PG (ref 27–31)
MCHC RBC AUTO-ENTMCNC: 33.6 G/DL (ref 32–36)
MCV RBC AUTO: 89 FL (ref 82–98)
MONOCYTES # BLD AUTO: 0.5 K/UL (ref 0.3–1)
MONOCYTES NFR BLD: 7.8 % (ref 4–15)
NEUTROPHILS # BLD AUTO: 3.1 K/UL (ref 1.8–7.7)
NEUTROPHILS NFR BLD: 54.2 % (ref 38–73)
NRBC BLD-RTO: 0 /100 WBC
OHS QRS DURATION: 78 MS
OHS QTC CALCULATION: 402 MS
PLATELET # BLD AUTO: 179 K/UL (ref 150–450)
PMV BLD AUTO: 9.4 FL (ref 9.2–12.9)
POTASSIUM SERPL-SCNC: 3.9 MMOL/L (ref 3.5–5.1)
PROT SERPL-MCNC: 7.2 G/DL (ref 6–8.4)
PROTHROMBIN TIME: 11.5 SEC (ref 9–12.5)
RBC # BLD AUTO: 4.39 M/UL (ref 4.6–6.2)
SODIUM SERPL-SCNC: 138 MMOL/L (ref 136–145)
TSH SERPL DL<=0.005 MIU/L-ACNC: 0.46 UIU/ML (ref 0.4–4)
WBC # BLD AUTO: 5.74 K/UL (ref 3.9–12.7)

## 2024-03-28 PROCEDURE — 99285 EMERGENCY DEPT VISIT HI MDM: CPT | Mod: 25

## 2024-03-28 PROCEDURE — 85025 COMPLETE CBC W/AUTO DIFF WBC: CPT

## 2024-03-28 PROCEDURE — 80053 COMPREHEN METABOLIC PANEL: CPT

## 2024-03-28 PROCEDURE — 85610 PROTHROMBIN TIME: CPT

## 2024-03-28 PROCEDURE — 93010 ELECTROCARDIOGRAM REPORT: CPT | Mod: ,,, | Performed by: INTERNAL MEDICINE

## 2024-03-28 PROCEDURE — 83735 ASSAY OF MAGNESIUM: CPT

## 2024-03-28 PROCEDURE — 93005 ELECTROCARDIOGRAM TRACING: CPT

## 2024-03-28 PROCEDURE — 84443 ASSAY THYROID STIM HORMONE: CPT

## 2024-03-28 RX ORDER — POLYETHYLENE GLYCOL 3350 17 G/17G
17 POWDER, FOR SOLUTION ORAL DAILY
Qty: 476 G | Refills: 0 | Status: SHIPPED | OUTPATIENT
Start: 2024-03-28 | End: 2024-04-27

## 2024-03-28 NOTE — ASSESSMENT & PLAN NOTE
Call received from ED RN stating that patient had eloped. Per nursing, patient had requested medication for heroin withdrawal and then immediately requested to leave. Notified that patient did not want to wait for a provider and that he had already left [facility] property. No AMA form was signed and patient had already left by the time provider was notified, unable to advise on risks associated with the refusal of medical care. I advised ED staff to document the conversation, the refusal to wait for a provider and to follow normal patient elopement/AMA protocols.

## 2024-03-28 NOTE — PROVIDER PROGRESS NOTES - EMERGENCY DEPT.
Encounter Date: 3/28/2024    ED Physician Progress Notes        Physician Note:   ED Resident HAND-OFF NOTE:  6:03 AM 3/28/2024  Tigre Galindo is a 53 y.o. male who presented to the ED on 3/28/2024 and has been managed by  and Dr. Rodriguez, who reports patient C/O dizziness. I assumed care of patient from off-going ED physician team at 6:03 AM pending CT head read.    On my evaluation, Tigre Galindo appears well, hemodynamically stable and in NAD. Thus far, Tigre Galindo has received:  Medications - No data to display    On my exam, I appreciate:  BP (!) 160/83   Pulse 77   Temp 98.5 °F (36.9 °C)   Resp 16   Wt 117.9 kg (260 lb)   SpO2 100%   BMI 36.26 kg/m²         Disposition: I anticipate patient will be discharged home.   I have discussed and counseled Tigre Galindo regarding exam, results, diagnosis, treatment, and plan.  ______________________  Ludmila Turner DO  Emergency Medicine Resident  6:03 AM 3/28/2024      UPDATE:         :  Dizziness  Tachycardia

## 2024-03-28 NOTE — ED TRIAGE NOTES
"Tigre Galindo, a 53 y.o. male presents to the ED w/ complaint of dizziness and chills    Triage note:  Chief Complaint   Patient presents with    Dizziness     Dizziness, chills. Seen here twice before for same compliant. "I left before they could give me anything."     Review of patient's allergies indicates:  No Known Allergies  No past medical history on file.    "

## 2024-03-28 NOTE — DISCHARGE SUMMARY
[facility] Emergency Dept  Neurocritical Care  Discharge Summary    Admit Date: 3/22/2024    Service Date: 03/28/2024    Discharge Date: 3/22/2024    Length of Stay: 1    Final Active Diagnoses:    Diagnosis Date Noted POA    PRINCIPAL PROBLEM:  Thrombotic stroke involving right middle cerebral artery [I63.311] 03/22/2024 Yes    Tobacco use [Z72.0] 03/22/2024 Yes    Drug use [F19.90] 03/22/2024 Yes      Problems Resolved During this Admission:      History of Present Illness: No notes on file    Hospital Course by Event: No notes on file    Hospital Course by Problem:   * Thrombotic stroke involving right middle cerebral artery  Call received from ED RN stating that patient had eloped. Per nursing, patient had requested medication for heroin withdrawal and then immediately requested to leave. Notified that patient did not want to wait for a provider and that he had already left [facility] property. No AMA form was signed and patient had already left by the time provider was notified, unable to advise on risks associated with the refusal of medical care. I advised ED staff to document the conversation, the refusal to wait for a provider and to follow normal patient elopement/AMA protocols.           Goals of Care Treatment Preferences:  Code Status: Full Code      Laboratory:  Lab Results   Component Value Date    CHOL 130 03/22/2024    HDL 36 (L) 03/22/2024    LDLCALC 83.8 03/22/2024    TRIG 51 03/22/2024    TSH 0.458 03/28/2024       Pending Results:     Consultations:  IP CONSULT TO PHYSICAL MEDICINE REHAB      Procedures:   * No surgery found * by * Surgery not found *.      Medications:      Medication List        ASK your doctor about these medications      meclizine 25 mg tablet  Commonly known as: ANTIVERT  Take 1 tablet (25 mg total) by mouth 3 (three) times daily as needed for Dizziness.            Diet: As tolerated    Activity: As tolerated.     Disposition: Discharged AMA prior to provider  evaluation.    Follow Up Plan:  PCP   Return to ER as needed    This discharge took less than 30 minutes to complete.    Vincent Mathews MD  Neurocritical Care  [facility] - Emergency Dept

## 2024-03-28 NOTE — ED PROVIDER NOTES
"Encounter Date: 3/28/2024       History     Chief Complaint   Patient presents with    Dizziness     Dizziness, chills. Seen here twice before for same compliant. "I left before they could give me anything."     Patient is a 53-year-old male history of tobacco use, drug use presenting to the emergency department for evaluation of lightheadedness with near-syncope.  Patient reports that he has had these ongoing over the past 2 weeks of chills and hot flashes.  Patient reports that he went to the emergency department and was concerned for stroke in which he received TNK.  After reviewing recent hospitalization patient was transferred over and admitted to neuro [facility] in which he left AMA secondary to need for drug use.  Patient reports that he has been feeling well since however yesterday he went to outside emergency department for a near syncopal episode in which he became scared.  There patient had labs and imaging performed and was discharged after being found in stable condition with no acute findings.  Patient reports that he had another episode earlier today.  He reports that he was getting up and he felt lightheaded.  He reports that there was no associated palpitations no loss of consciousness trauma, chest pain.  He denies any urinary incontinence.    The history is provided by the patient.     Review of patient's allergies indicates:  No Known Allergies  History reviewed. No pertinent past medical history.  History reviewed. No pertinent surgical history.  History reviewed. No pertinent family history.  Social History     Tobacco Use    Smoking status: Some Days     Types: Cigars   Substance Use Topics    Alcohol use: Yes     Comment: OCCASIONAL    Drug use: Yes     Types: Marijuana, Heroin     Comment: heroin       Physical Exam     Initial Vitals [03/28/24 0206]   BP Pulse Resp Temp SpO2   (!) 160/83 77 16 98.5 °F (36.9 °C) 100 %      MAP       --         Physical Exam    Nursing note and vitals " reviewed.  Constitutional: He appears well-developed and well-nourished.   Eyes: EOM are normal. Pupils are equal, round, and reactive to light.   Cardiovascular:  Normal rate and regular rhythm.           Pulmonary/Chest: He has rhonchi.   Rhonchorous breath sounds heard in the upper right lung fields   Abdominal: Abdomen is soft. He exhibits no distension.   Musculoskeletal:         General: Normal range of motion.     Neurological: He is alert and oriented to person, place, and time. No cranial nerve deficit or sensory deficit.   Skin: Skin is warm and dry.         ED Course   Procedures  Labs Reviewed   CBC W/ AUTO DIFFERENTIAL - Abnormal; Notable for the following components:       Result Value    RBC 4.39 (*)     Hemoglobin 13.1 (*)     Hematocrit 39.0 (*)     RDW 14.6 (*)     All other components within normal limits   COMPREHENSIVE METABOLIC PANEL - Abnormal; Notable for the following components:    Alkaline Phosphatase 52 (*)     Anion Gap 6 (*)     All other components within normal limits   MAGNESIUM   PROTIME-INR   TSH          Imaging Results              CT Head Without Contrast (In process)                      Medications - No data to display  Medical Decision Making  53-year-old male presenting to emergency department due to near-syncope.  Patient was recently hospitalized after receiving TNK.  MRI with no signs of acute strokes however patient left AMA from neuro [facility].  During his hospitalization patient had lab work ups as well as an echo which showed normal EF and normal systolic/diastolic function.  Patient went to outside emergency department where he had labs performed with normal troponin BNP chest x-ray with no anemia.  Here patient is reporting similar symptoms yesterday with no changes.  At this time plan for head CT as patient has not had any imaging since receiving TNK and leaving AMA.  Labs with no gross derangements at this time.  Patient likely disposition discharge if CT head  negative.  Patient care handed over to oncoming care team see progress note for final plan and disposition.    Amount and/or Complexity of Data Reviewed  Labs: ordered.  Radiology: ordered.                                      Clinical Impression:  Final diagnoses:  [R42] Dizziness  [R00.0] Tachycardia                 Doug Diane MD  Resident  03/28/24 0596

## 2024-06-24 PROBLEM — I63.311 THROMBOTIC STROKE INVOLVING RIGHT MIDDLE CEREBRAL ARTERY: Status: RESOLVED | Noted: 2024-03-22 | Resolved: 2024-06-24

## 2024-07-23 ENCOUNTER — DOCUMENTATION ONLY (OUTPATIENT)
Dept: NEUROLOGY | Facility: HOSPITAL | Age: 54
End: 2024-07-23